# Patient Record
(demographics unavailable — no encounter records)

---
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
## 2024-11-05 NOTE — ASSESSMENT
[FreeTextEntry1] : Pulmonary nodule  Reviewed the CT scan and discussed with the patient in the hospital.  Patient's nodule is close to the airway centrally located.  The patient has no risk factor for lung cancer.  The patient was born in [facility] near [place].  Patient has low risk for lung cancer.  The CT scan was done in April and the patient is totally asymptomatic with no evidence of airway disease.  I recommended to repeat the CT scan.  I informed the patient if the CAT scan is part still positive for the nodule then we will proceed with bronchoscopy and biopsy to rule out underlying malignancy but I doubt it at this point.  Patient had an skin nodules and was told by her primary physician that it looks like erythema nodosum.  I the picture is not typical for sarcoidosis but will follow.  PFT and 6-minute walk test were normal